# Patient Record
Sex: MALE | Race: OTHER | HISPANIC OR LATINO | Employment: UNEMPLOYED | URBAN - METROPOLITAN AREA
[De-identification: names, ages, dates, MRNs, and addresses within clinical notes are randomized per-mention and may not be internally consistent; named-entity substitution may affect disease eponyms.]

---
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
## 2020-07-08 ENCOUNTER — HOSPITAL ENCOUNTER (EMERGENCY)
Facility: HOSPITAL | Age: 20
Discharge: HOME | End: 2020-07-08
Attending: EMERGENCY MEDICINE | Admitting: EMERGENCY MEDICINE
Payer: COMMERCIAL

## 2020-07-08 VITALS
WEIGHT: 146 LBS | RESPIRATION RATE: 16 BRPM | HEART RATE: 84 BPM | OXYGEN SATURATION: 98 % | BODY MASS INDEX: 22.2 KG/M2 | SYSTOLIC BLOOD PRESSURE: 133 MMHG | TEMPERATURE: 98.5 F | DIASTOLIC BLOOD PRESSURE: 52 MMHG

## 2020-07-08 DIAGNOSIS — H10.9 BACTERIAL CONJUNCTIVITIS OF LEFT EYE: Primary | ICD-10-CM

## 2020-07-08 PROCEDURE — 99283 EMERGENCY DEPT VISIT LOW MDM: CPT

## 2020-07-08 PROCEDURE — NC001 PR NO CHARGE: Performed by: EMERGENCY MEDICINE

## 2020-07-08 RX ORDER — SULFACETAMIDE SODIUM 100 MG/ML
2 SOLUTION/ DROPS OPHTHALMIC ONCE
Status: COMPLETED | OUTPATIENT
Start: 2020-07-08 | End: 2020-07-08

## 2020-07-08 RX ORDER — TETRACAINE HYDROCHLORIDE 5 MG/ML
2 SOLUTION OPHTHALMIC ONCE
Status: COMPLETED | OUTPATIENT
Start: 2020-07-08 | End: 2020-07-08

## 2020-07-08 RX ORDER — IBUPROFEN 400 MG/1
800 TABLET ORAL ONCE
Status: COMPLETED | OUTPATIENT
Start: 2020-07-08 | End: 2020-07-08

## 2020-07-08 RX ORDER — ACETAMINOPHEN 325 MG/1
975 TABLET ORAL ONCE
Status: COMPLETED | OUTPATIENT
Start: 2020-07-08 | End: 2020-07-08

## 2020-07-08 RX ORDER — SULFACETAMIDE SODIUM 100 MG/ML
2 SOLUTION/ DROPS OPHTHALMIC
Qty: 10 ML | Refills: 0 | Status: SHIPPED | OUTPATIENT
Start: 2020-07-08 | End: 2020-07-15

## 2020-07-08 RX ADMIN — TETRACAINE HYDROCHLORIDE 2 DROP: 5 SOLUTION OPHTHALMIC at 00:50

## 2020-07-08 RX ADMIN — IBUPROFEN 800 MG: 400 TABLET ORAL at 00:50

## 2020-07-08 RX ADMIN — ACETAMINOPHEN 975 MG: 325 TABLET ORAL at 00:50

## 2020-07-08 RX ADMIN — FLUORESCEIN SODIUM 1 STRIP: 1 STRIP OPHTHALMIC at 00:50

## 2020-07-08 RX ADMIN — SULFACETAMIDE SODIUM 2 DROP: 100 SOLUTION OPHTHALMIC at 01:30

## 2020-07-08 NOTE — ED ATTENDING ATTESTATION
7/8/2020  IShannon DO, saw and evaluated the patient  I have discussed the patient with the resident/non-physician practitioner and agree with the resident's/non-physician practitioner's findings, Plan of Care, and MDM as documented in the resident's/non-physician practitioner's note, except where noted  All available labs and Radiology studies were reviewed  I was present for key portions of any procedure(s) performed by the resident/non-physician practitioner and I was immediately available to provide assistance  At this point I agree with the current assessment done in the Emergency Department  I have conducted an independent evaluation of this patient a history and physical is as follows:    19yo male presents with left eye redness and drainage  Pt states symptoms started about 3 weeks ago and was using drops at home without relief  No fevers, no pain with eye mvmt, does not wear contacts  On exam - nad, min swelling of eyelid but no orbital redness, swelling or significant tenderness, injected conjunctiva on left with purulent drainage noted, EOMI without discomfort  Plan - check visual acuity, fluoroscein eye    If ok will d/c with abx drops and f/u ophtho    ED Course         Critical Care Time  Procedures

## 2020-07-09 NOTE — ED PROVIDER NOTES
The pharmacy called and stated that the patient's sulfacetamide ophthalmic solution is not covered under his Plan and needs prior authorization    The patient will be given a prescription for ciprofloxacin ophthalmic drops       Emily Penny MD  07/08/20 7746

## 2023-02-09 RX ORDER — DEXMETHYLPHENIDATE HYDROCHLORIDE 20 MG/1
CAPSULE, EXTENDED RELEASE ORAL
COMMUNITY
Start: 2014-10-21

## 2023-02-09 RX ORDER — CLONIDINE HYDROCHLORIDE 0.1 MG/1
TABLET ORAL
COMMUNITY
Start: 2014-12-29

## 2023-02-09 RX ORDER — LORATADINE 10 MG/1
1 TABLET ORAL DAILY
COMMUNITY
Start: 2013-02-07

## 2023-02-13 ENCOUNTER — OFFICE VISIT (OUTPATIENT)
Dept: DENTISTRY | Facility: CLINIC | Age: 23
End: 2023-02-13

## 2023-02-13 VITALS — DIASTOLIC BLOOD PRESSURE: 61 MMHG | TEMPERATURE: 98.7 F | SYSTOLIC BLOOD PRESSURE: 109 MMHG | HEART RATE: 53 BPM

## 2023-02-13 DIAGNOSIS — K08.9 EXTRACTION OF TOOTH NEEDED: ICD-10-CM

## 2023-02-13 DIAGNOSIS — Z01.20 ENCOUNTER FOR DENTAL EXAMINATION: Primary | ICD-10-CM

## 2023-02-13 PROBLEM — K05.30 PERIODONTITIS: Status: ACTIVE | Noted: 2023-02-13

## 2023-02-13 NOTE — DENTAL PROCEDURE DETAILS
Satish Iqbal presents for a Comprehensive exam  Verbal consent for treatment given in addition to the forms  Reviewed health history - Patient is ASA II  Consents signed: Yes    CC: "I broke my front tooth a while ago, nail biting habit, but it just recently started hurting"  Perio: Generalized  Pain Scale: 3  Caries Assessment: Medium  Radiographs: PANO, 4BWX, PA #8      Treatment Plan:    Periodontal therapy: FM ScRp by [facility] 4+ each  PERIO:2B    Caries control: as charted    Ortho consult needed here at [facility]    Patient will need referral to [facility] for 3rd molar extraction of 16,17,S17 and 32 and possibly #1  Will wait until after ortho consult to give patient referral to [facility], possible keeping #1  Prognosis is Good  Referrals needed: Not at this time    Next Visit:  1)Ortho Consult 2) FM SRP by [employer]

## 2023-10-03 ENCOUNTER — OFFICE VISIT (OUTPATIENT)
Dept: URGENT CARE | Age: 23
End: 2023-10-03
Payer: COMMERCIAL

## 2023-10-03 VITALS
HEART RATE: 56 BPM | SYSTOLIC BLOOD PRESSURE: 120 MMHG | OXYGEN SATURATION: 99 % | TEMPERATURE: 97 F | DIASTOLIC BLOOD PRESSURE: 60 MMHG | RESPIRATION RATE: 20 BRPM

## 2023-10-03 DIAGNOSIS — R10.31 RLQ ABDOMINAL PAIN: Primary | ICD-10-CM

## 2023-10-03 DIAGNOSIS — R11.2 NAUSEA AND VOMITING, UNSPECIFIED VOMITING TYPE: ICD-10-CM

## 2023-10-03 PROCEDURE — 99213 OFFICE O/P EST LOW 20 MIN: CPT

## 2023-10-03 RX ORDER — ONDANSETRON 4 MG/1
4 TABLET, ORALLY DISINTEGRATING ORAL EVERY 6 HOURS PRN
Qty: 20 TABLET | Refills: 0 | Status: SHIPPED | OUTPATIENT
Start: 2023-10-03 | End: 2023-10-03 | Stop reason: CLARIF

## 2023-10-03 NOTE — PATIENT INSTRUCTIONS
Start Zofran as prescribed  Diet as tolerated  Drink plenty of water! May drink pedialyte or gatorade for electrolyte replacement  Follow up with PCP in 3-5 days. Proceed to  ER if symptoms worsen.

## 2023-10-03 NOTE — PROGRESS NOTES
[facility]        NAME: Jeffery Neumann is a 21 y.o. male  : 2000    MRN: [id number]  DATE: October 3, 2023  TIME: 12:50 PM    Assessment and Plan   RLQ abdominal pain [R10.31]  1. RLQ abdominal pain  Transfer to other facility      2. Nausea and vomiting, unspecified vomiting type  Transfer to other facility    DISCONTINUED: ondansetron (ZOFRAN-ODT) 4 mg disintegrating tablet        Discussed with patient at length risks vs. benefits of transferring to ER for further evaluation and treatment. Patient verbalized understanding. Patient Instructions     Proceed to ER for further evaluation and treatment. Chief Complaint     Chief Complaint   Patient presents with   • Vomiting     Started  morning with vomiting. Pt also states that he also had diarrhea and has a burning sensation in hi stomach          History of Present Illness       Patient is a 22 yo male with no significant PMH presenting in the clinic today for vomiting x 2 days. Admits vomiting, diarrhea, epigastric pain, and sore throat. 3 episodes of vomiting since this morning. Patient mentions 6-8 episodes of diarrhea today. Denies fever, chills, body aches, fatigue, ear pain, headache, decreased appetite, chest pain, and SOB. Admits the use of Advil for sx management. Patient denies recent dietary changes. Denies recent sick contacts. Patient requesting note for work as he left work prior to the end of his shift today due to vomiting. Review of Systems   Review of Systems   Constitutional: Negative for chills, fatigue and fever. HENT: Negative for congestion, ear pain, postnasal drip, rhinorrhea, sinus pressure, sinus pain and sore throat. Respiratory: Negative for cough and shortness of breath. Cardiovascular: Negative for chest pain. Gastrointestinal: Positive for abdominal pain, diarrhea, nausea and vomiting. Musculoskeletal: Negative for myalgias. Skin: Negative for rash.    Neurological: Negative for headaches. Current Medications       Current Outpatient Medications:   •  acetaminophen (TYLENOL) 325 mg tablet, Take 2 tablets by mouth every 6 (six) hours as needed for mild pain or fever (Patient not taking: Reported on 10/3/2023), Disp: 20 tablet, Rfl: 0  •  cloNIDine (CATAPRES) 0.1 mg tablet, Take by mouth (Patient not taking: Reported on 10/3/2023), Disp: , Rfl:   •  dexmethylphenidate (Focalin XR) 20 MG 24 hr capsule, Take by mouth (Patient not taking: Reported on 10/3/2023), Disp: , Rfl:   •  ibuprofen (MOTRIN) 400 mg tablet, Take 1 tablet by mouth every 6 (six) hours as needed for mild pain or fever (Patient not taking: Reported on 10/3/2023), Disp: 20 tablet, Rfl: 0  •  ibuprofen (MOTRIN) 600 mg tablet, Take 1 tablet (600 mg total) by mouth every 6 (six) hours as needed for moderate pain (Patient not taking: Reported on 10/3/2023), Disp: 30 tablet, Rfl: 0  •  loratadine (Claritin) 10 mg tablet, Take 1 tablet by mouth daily (Patient not taking: Reported on 10/3/2023), Disp: , Rfl:     Current Allergies     Allergies as of 10/03/2023   • (No Known Allergies)            The following portions of the patient's history were reviewed and updated as appropriate: allergies, current medications, past family history, past medical history, past social history, past surgical history and problem list.     Past Medical History:   Diagnosis Date   • ADHD (attention deficit hyperactivity disorder)        Past Surgical History:   Procedure Laterality Date   • KNEE SURGERY Right        No family history on file. Medications have been verified. Objective   /60   Pulse 56   Temp (!) 97 °F (36.1 °C)   Resp 20   SpO2 99%        Physical Exam     Physical Exam  Vitals reviewed. Constitutional:       General: He is not in acute distress. Appearance: Normal appearance. He is normal weight. He is ill-appearing.       Comments: Patient unable to sit comfortably throughout entire exam secondary to abdominal pain. HENT:      Head: Normocephalic. Nose: Nose normal. No congestion or rhinorrhea. Mouth/Throat:      Mouth: Mucous membranes are moist.   Eyes:      General:         Right eye: No discharge. Left eye: No discharge. Conjunctiva/sclera: Conjunctivae normal.   Cardiovascular:      Rate and Rhythm: Normal rate and regular rhythm. Pulses: Normal pulses. Heart sounds: Normal heart sounds. No friction rub. No gallop. Pulmonary:      Effort: Pulmonary effort is normal.      Breath sounds: Normal breath sounds. No wheezing, rhonchi or rales. Abdominal:      General: Abdomen is flat. Bowel sounds are normal. There is no distension. Palpations: Abdomen is soft. Tenderness: There is abdominal tenderness in the right lower quadrant and epigastric area. There is no guarding. Positive signs include McBurney's sign and psoas sign. Musculoskeletal:      Cervical back: Normal range of motion and neck supple. Skin:     General: Skin is warm. Findings: No rash. Neurological:      Mental Status: He is alert.    Psychiatric:         Mood and Affect: Mood normal.         Behavior: Behavior normal.

## 2023-10-03 NOTE — LETTER
October 3, 2023     Patient: Jeffery Neumann   YOB: 2000   Date of Visit: 10/3/2023       To Whom it May Concern:    Jeffery Neumann was seen in my clinic on 10/3/2023. He may return to work on 10/4/2023 . If you have any questions or concerns, please don't hesitate to call.          Sincerely,          Kirsten Art PA-C        CC: No Recipients

## 2023-10-03 NOTE — LETTER
October 3, 2023     Patient: Petar Reis   YOB: 2000   Date of Visit: 10/3/2023       To Whom it May Concern:    Petar Reis was seen in my clinic on 10/3/2023. If you have any questions or concerns, please don't hesitate to call.          Sincerely,          Kirsten Art PA-C        CC: No Recipients

## 2024-05-13 ENCOUNTER — APPOINTMENT (OUTPATIENT)
Dept: URGENT CARE | Age: 24
End: 2024-05-13

## 2024-06-15 ENCOUNTER — DOCUMENTATION (OUTPATIENT)
Dept: URGENT CARE | Age: 24
End: 2024-06-15

## 2024-06-15 NOTE — PROGRESS NOTES
"Patient was called back and we discussed his treatment options, patient declined to be seen here if he was going to be sent to the ER, patient decided for a higher level of care due to his symptoms. Patients c/o was vomiting blood x 3 months. Patient did not appear in distress, refused a set of vital signs. We were happy to see him here but patient stated \"I don't want to waste time and will go to the ER right now\". Patients friend will transport him to the ER.   "